# Patient Record
Sex: FEMALE | Race: BLACK OR AFRICAN AMERICAN | NOT HISPANIC OR LATINO | Employment: FULL TIME | URBAN - METROPOLITAN AREA
[De-identification: names, ages, dates, MRNs, and addresses within clinical notes are randomized per-mention and may not be internally consistent; named-entity substitution may affect disease eponyms.]

---

## 2022-06-06 PROBLEM — M51.369 BULGING OF LUMBAR INTERVERTEBRAL DISC: Status: ACTIVE | Noted: 2022-06-06

## 2022-09-02 PROBLEM — E66.812 CLASS 2 OBESITY DUE TO EXCESS CALORIES WITHOUT SERIOUS COMORBIDITY WITH BODY MASS INDEX (BMI) OF 37.0 TO 37.9 IN ADULT: Status: ACTIVE | Noted: 2022-09-02

## 2022-11-01 PROBLEM — G43.909 MIGRAINE: Chronic | Status: RESOLVED | Noted: 2020-02-06 | Resolved: 2022-11-01

## 2022-11-07 PROBLEM — R06.89 ACUTE RESPIRATORY INSUFFICIENCY: Status: RESOLVED | Noted: 2022-11-01 | Resolved: 2022-11-07

## 2023-04-12 PROBLEM — Z53.20 MAMMOGRAM DECLINED: Status: ACTIVE | Noted: 2023-04-12

## 2023-04-12 PROBLEM — U07.1 COVID-19: Status: RESOLVED | Noted: 2022-11-01 | Resolved: 2023-04-12

## 2023-04-12 PROBLEM — R73.01 IFG (IMPAIRED FASTING GLUCOSE): Status: ACTIVE | Noted: 2023-04-12

## 2023-05-09 ENCOUNTER — TELEPHONE (OUTPATIENT)
Dept: FAMILY MEDICINE CLINIC | Facility: CLINIC | Age: 53
End: 2023-05-09

## 2023-05-09 NOTE — TELEPHONE ENCOUNTER
Received form from [employer] [employer] will not complete without an appointment  LMOM to schedule an appointment

## 2023-05-10 ENCOUNTER — APPOINTMENT (OUTPATIENT)
Dept: LAB | Facility: CLINIC | Age: 53
End: 2023-05-10

## 2023-05-10 ENCOUNTER — OFFICE VISIT (OUTPATIENT)
Dept: FAMILY MEDICINE CLINIC | Facility: CLINIC | Age: 53
End: 2023-05-10

## 2023-05-10 VITALS
DIASTOLIC BLOOD PRESSURE: 90 MMHG | WEIGHT: 210.5 LBS | TEMPERATURE: 97.6 F | HEIGHT: 62 IN | SYSTOLIC BLOOD PRESSURE: 130 MMHG | BODY MASS INDEX: 38.74 KG/M2

## 2023-05-10 DIAGNOSIS — D50.8 IRON DEFICIENCY ANEMIA SECONDARY TO INADEQUATE DIETARY IRON INTAKE: ICD-10-CM

## 2023-05-10 DIAGNOSIS — R73.01 IFG (IMPAIRED FASTING GLUCOSE): ICD-10-CM

## 2023-05-10 DIAGNOSIS — E78.2 MIXED HYPERLIPIDEMIA: Chronic | ICD-10-CM

## 2023-05-10 DIAGNOSIS — M51.36 BULGING OF LUMBAR INTERVERTEBRAL DISC: Primary | ICD-10-CM

## 2023-05-10 DIAGNOSIS — Z12.31 ENCOUNTER FOR SCREENING MAMMOGRAM FOR MALIGNANT NEOPLASM OF BREAST: ICD-10-CM

## 2023-05-10 DIAGNOSIS — M43.06 LUMBAR SPONDYLOLYSIS: ICD-10-CM

## 2023-05-10 LAB
ALBUMIN SERPL BCP-MCNC: 3.6 G/DL (ref 3.5–5)
ALP SERPL-CCNC: 123 U/L (ref 46–116)
ALT SERPL W P-5'-P-CCNC: 25 U/L (ref 12–78)
ANION GAP SERPL CALCULATED.3IONS-SCNC: 1 MMOL/L (ref 4–13)
AST SERPL W P-5'-P-CCNC: 23 U/L (ref 5–45)
BASOPHILS # BLD AUTO: 0.08 THOUSANDS/ÂΜL (ref 0–0.1)
BASOPHILS NFR BLD AUTO: 1 % (ref 0–1)
BILIRUB SERPL-MCNC: 0.17 MG/DL (ref 0.2–1)
BILIRUB UR QL STRIP: NEGATIVE
BUN SERPL-MCNC: 13 MG/DL (ref 5–25)
CALCIUM SERPL-MCNC: 9.4 MG/DL (ref 8.3–10.1)
CHLORIDE SERPL-SCNC: 103 MMOL/L (ref 96–108)
CHOLEST SERPL-MCNC: 268 MG/DL
CLARITY UR: CLEAR
CO2 SERPL-SCNC: 31 MMOL/L (ref 21–32)
COLOR UR: NORMAL
CREAT SERPL-MCNC: 0.96 MG/DL (ref 0.6–1.3)
EOSINOPHIL # BLD AUTO: 0.26 THOUSAND/ÂΜL (ref 0–0.61)
EOSINOPHIL NFR BLD AUTO: 4 % (ref 0–6)
ERYTHROCYTE [DISTWIDTH] IN BLOOD BY AUTOMATED COUNT: 13.2 % (ref 11.6–15.1)
EST. AVERAGE GLUCOSE BLD GHB EST-MCNC: 128 MG/DL
FERRITIN SERPL-MCNC: 56 NG/ML (ref 8–388)
GFR SERPL CREATININE-BSD FRML MDRD: 67 ML/MIN/1.73SQ M
GLUCOSE P FAST SERPL-MCNC: 98 MG/DL (ref 65–99)
GLUCOSE UR STRIP-MCNC: NEGATIVE MG/DL
HBA1C MFR BLD: 6.1 %
HCT VFR BLD AUTO: 45.4 % (ref 34.8–46.1)
HDLC SERPL-MCNC: 47 MG/DL
HGB BLD-MCNC: 13.8 G/DL (ref 11.5–15.4)
HGB UR QL STRIP.AUTO: NEGATIVE
IMM GRANULOCYTES # BLD AUTO: 0.02 THOUSAND/UL (ref 0–0.2)
IMM GRANULOCYTES NFR BLD AUTO: 0 % (ref 0–2)
IRON SATN MFR SERPL: 19 % (ref 15–50)
IRON SERPL-MCNC: 68 UG/DL (ref 50–170)
KETONES UR STRIP-MCNC: NEGATIVE MG/DL
LDLC SERPL CALC-MCNC: 200 MG/DL (ref 0–100)
LEUKOCYTE ESTERASE UR QL STRIP: NEGATIVE
LYMPHOCYTES # BLD AUTO: 2.69 THOUSANDS/ÂΜL (ref 0.6–4.47)
LYMPHOCYTES NFR BLD AUTO: 41 % (ref 14–44)
MCH RBC QN AUTO: 27.2 PG (ref 26.8–34.3)
MCHC RBC AUTO-ENTMCNC: 30.4 G/DL (ref 31.4–37.4)
MCV RBC AUTO: 90 FL (ref 82–98)
MONOCYTES # BLD AUTO: 0.64 THOUSAND/ÂΜL (ref 0.17–1.22)
MONOCYTES NFR BLD AUTO: 10 % (ref 4–12)
NEUTROPHILS # BLD AUTO: 2.91 THOUSANDS/ÂΜL (ref 1.85–7.62)
NEUTS SEG NFR BLD AUTO: 44 % (ref 43–75)
NITRITE UR QL STRIP: NEGATIVE
NRBC BLD AUTO-RTO: 0 /100 WBCS
PH UR STRIP.AUTO: 6.5 [PH]
PLATELET # BLD AUTO: 313 THOUSANDS/UL (ref 149–390)
PMV BLD AUTO: 10.2 FL (ref 8.9–12.7)
POTASSIUM SERPL-SCNC: 3.4 MMOL/L (ref 3.5–5.3)
PROT SERPL-MCNC: 8.3 G/DL (ref 6.4–8.4)
PROT UR STRIP-MCNC: NEGATIVE MG/DL
RBC # BLD AUTO: 5.07 MILLION/UL (ref 3.81–5.12)
SODIUM SERPL-SCNC: 135 MMOL/L (ref 135–147)
SP GR UR STRIP.AUTO: 1.01 (ref 1–1.03)
TIBC SERPL-MCNC: 358 UG/DL (ref 250–450)
TRIGL SERPL-MCNC: 103 MG/DL
UROBILINOGEN UR STRIP-ACNC: <2 MG/DL
WBC # BLD AUTO: 6.6 THOUSAND/UL (ref 4.31–10.16)

## 2023-05-10 RX ORDER — CYCLOBENZAPRINE HCL 10 MG
10 TABLET ORAL 3 TIMES DAILY PRN
Qty: 30 TABLET | Refills: 2 | Status: SHIPPED | OUTPATIENT
Start: 2023-05-10

## 2023-05-10 NOTE — ASSESSMENT & PLAN NOTE
Patient was advised to continue Celebrex twice daily and Flexeril as needed for her pain  Patient reports that she will contact pain management to schedule an office visit in the near future

## 2023-05-10 NOTE — PROGRESS NOTES
Name: Natalie Nesbitt      : 1970      MRN: [id number]  Encounter Provider: ANDRZEJ Finley  Encounter Date: 5/10/2023   Encounter department: [facility] PRIMARY CARE    Assessment & Plan     1  Bulging of lumbar intervertebral disc  Assessment & Plan:  Patient was advised to continue Celebrex twice daily and Flexeril as needed for her pain  Patient reports that she will contact pain management to schedule an office visit in the near future  Orders:  -     cyclobenzaprine (FLEXERIL) 10 mg tablet; Take 1 tablet (10 mg total) by mouth 3 (three) times a day as needed for muscle spasms    2  Encounter for screening mammogram for malignant neoplasm of breast  -     Mammo screening bilateral w 3d & cad; Future; Expected date: 05/10/2023    3  Lumbar spondylolysis  Assessment & Plan:  Patient was advised to continue Celebrex twice daily and Flexeril as needed for her pain  Patient reports that she will contact pain management to schedule an office visit in the near future  Orders:  -     cyclobenzaprine (FLEXERIL) 10 mg tablet; Take 1 tablet (10 mg total) by mouth 3 (three) times a day as needed for muscle spasms    4  Mixed hyperlipidemia  Assessment & Plan:  Patient does have a lipid panel ordered to be completed prior to next office visit  Patient was advised to continue to limit fatty and fried foods in her diet  Subjective      Bulging of lumbar intervertebral disc/lumbar spondylosis: Patient does have a noted history of chronic lower back pain with recurrent sciatica symptoms  Patient did have an x-ray of her lumbar spine completed in  which showed lumbar spondylosis  She then had an MRI of her lumbar spine also completed in  which showed mild disc bulge with minimal narrowing L3-4 and L4-5, with mild-to-moderate spinal stenosis L5-S1    Patient was prescribed Celebrex 200 mg twice daily and Flexeril 10 mg 3 times daily as needed at her last office visit to help with her pain  Patient was also referred to pain management at that time but has not been scheduled to be seen by them yet  Patient reports that she was contacted by pain management to schedule appointment but she told them that she will call them back in the near future to be scheduled  Patient reports that the Celebrex and Flexeril have been improving her pain  She is still reporting recurrent lower back pain  Hyperlipidemia: Patient's most recent lipid panel did show elevated total cholesterol and LDL  Patient is currently managed on Lipitor 80 mg daily  Review of Systems   Constitutional: Negative for chills and fever  HENT: Negative for ear pain and sore throat  Eyes: Negative for pain and visual disturbance  Respiratory: Negative for cough, chest tightness, shortness of breath and wheezing  Cardiovascular: Negative for chest pain, palpitations and leg swelling  Gastrointestinal: Negative for abdominal pain, constipation, diarrhea, nausea and vomiting  Endocrine: Negative for cold intolerance and heat intolerance  Genitourinary: Negative for decreased urine volume, dysuria and hematuria  Musculoskeletal: Positive for back pain (lower-chronic )  Negative for arthralgias  Skin: Negative for color change and rash  Allergic/Immunologic: Negative for environmental allergies  Neurological: Negative for dizziness, seizures, syncope, weakness, light-headedness, numbness and headaches  Hematological: Negative for adenopathy  Psychiatric/Behavioral: Negative for confusion  The patient is not nervous/anxious  All other systems reviewed and are negative        Current Outpatient Medications on File Prior to Visit   Medication Sig   • acetaminophen (TYLENOL) 500 mg tablet Take 2 tablets (1,000 mg total) by mouth every 6 (six) hours as needed for moderate pain   • aspirin 81 mg chewable tablet Chew 1 tablet (81 mg total) daily (Patient taking differently: Chew 81 mg as needed)   • "atorvastatin (LIPITOR) 80 mg tablet Take 1 tablet (80 mg total) by mouth daily   • butalbital-acetaminophen-caffeine (FIORICET,ESGIC) -40 mg per tablet TAKE 1 TABLET BY MOUTH EVERY 4 HOURS AS NEEDED FOR MIGRAINE  NO MORE THAN 3 DOSES EVERY WEEK   • celecoxib (CeleBREX) 200 mg capsule Take 1 capsule (200 mg total) by mouth 2 (two) times a day   • cholecalciferol (VITAMIN D3) 1,000 units tablet Take 1,000 Units by mouth daily   • Diclofenac Sodium (VOLTAREN) 1 % Apply 2 g topically 4 (four) times a day   • liraglutide (SAXENDA) injection Inject 0 1 mL (0 6 mg total) under the skin daily   • meclizine (ANTIVERT) 12 5 MG tablet 1 tab TID prn vertigo  • midodrine (PROAMATINE) 5 mg tablet TAKE 1 TABLET BY MOUTH AS NEEDED FOR VERTIGO   • nitroglycerin (NITROSTAT) 0 4 mg SL tablet DISSOLVE 1 TABLET UNDER THE TONGUE EVERY 5 MINUTES AS NEEDED FOR CHEST PAIN   • omeprazole (PriLOSEC) 40 MG capsule TAKE 1 CAPSULE(40 MG) BY MOUTH EVERY MORNING   • [DISCONTINUED] cyclobenzaprine (FLEXERIL) 10 mg tablet Take 1 tablet (10 mg total) by mouth 3 (three) times a day as needed for muscle spasms       Objective     /90 (BP Location: Right arm, Patient Position: Sitting, Cuff Size: Large)   Temp 97 6 °F (36 4 °C) (Temporal)   Ht 5' 2\" (1 575 m)   Wt 95 5 kg (210 lb 8 oz)   LMP 01/03/2022 (Approximate)   BMI 38 50 kg/m²     Physical Exam  Vitals and nursing note reviewed  Constitutional:       General: She is not in acute distress  Appearance: Normal appearance  She is not ill-appearing  HENT:      Head: Normocephalic  Eyes:      Conjunctiva/sclera: Conjunctivae normal    Cardiovascular:      Rate and Rhythm: Normal rate and regular rhythm  Pulses: Normal pulses  Carotid pulses are 2+ on the right side and 2+ on the left side  Radial pulses are 2+ on the right side and 2+ on the left side  Posterior tibial pulses are 2+ on the right side and 2+ on the left side        Heart sounds: " Normal heart sounds  No murmur heard  Pulmonary:      Effort: Pulmonary effort is normal  No respiratory distress  Breath sounds: Normal breath sounds  No decreased breath sounds, wheezing, rhonchi or rales  Abdominal:      General: Abdomen is flat  Bowel sounds are normal  There is no distension  Palpations: Abdomen is soft  Tenderness: There is no abdominal tenderness  There is no guarding  Musculoskeletal:      Cervical back: Normal range of motion  Lumbar back: Spasms, tenderness and bony tenderness present  No swelling or edema  Decreased range of motion  Right lower leg: No edema  Left lower leg: No edema  Skin:     General: Skin is warm and dry  Capillary Refill: Capillary refill takes less than 2 seconds  Neurological:      General: No focal deficit present  Mental Status: She is alert and oriented to person, place, and time  Psychiatric:         Mood and Affect: Mood normal          Behavior: Behavior normal          Thought Content:  Thought content normal          Judgment: Judgment normal        ANDRZEJ Foster

## 2023-05-11 PROBLEM — R73.03 PREDIABETES: Status: ACTIVE | Noted: 2023-04-12

## 2023-05-22 ENCOUNTER — TELEPHONE (OUTPATIENT)
Dept: FAMILY MEDICINE CLINIC | Facility: CLINIC | Age: 53
End: 2023-05-22

## 2023-05-22 NOTE — TELEPHONE ENCOUNTER
Received a voicemail:    Yes, good morning  My name is Rachelle Troy  I was trying to find out if I could drop off my FMLA papers to [employer]  If someone could give me a call back at [phone]  Thank you     _____________________________________________    I called patient and left a voicemail that she can drop off the forms and we can give them to [address] [place] [address], and if an office visit is needed we would let her know  I also said that we could just schedule an appointment to have the forms done then  Asked patient to call back

## 2023-05-24 ENCOUNTER — TELEPHONE (OUTPATIENT)
Dept: FAMILY MEDICINE CLINIC | Facility: CLINIC | Age: 53
End: 2023-05-24

## 2023-05-24 NOTE — TELEPHONE ENCOUNTER
Please ask patient if she has missed work for a continuous period of time due to her lower back pain  Also, does she think that she will need to miss work on an intermittent basis due to this? If she does feel that she needs to miss work how often does she feel this will be necessary? For example, how many times per month will she need to miss? Also, please advise patient to schedule an office visit for follow-up with pain management

## 2023-06-01 NOTE — TELEPHONE ENCOUNTER
Patient is calling back:    1  Patient hasn't missed work for a continuous amount of time because she did not have the FMLA, so she went to work with the pain  If anything, she was out for 2 days maximum with the pain  2  Patient states if she missed it would be between 3-6 days a month  3  I did let her know to follow up with pain management  Per pt, they didn't have appointments until late July into August  She is going to call back & see if they have anything sooner   Advised patient to take the July appointment & to follow up with them to see if anything opened sooner, or if there is a cancellation list

## 2023-06-02 NOTE — TELEPHONE ENCOUNTER
LMOM to let patient know that form is completed and that there is a charge of 25 00 for form before it can be faxed or picked up  Form is in [employer]  Once payment is received it needs to be faxed to [phone] and then scan form to chart

## 2023-06-09 ENCOUNTER — CONSULT (OUTPATIENT)
Dept: PAIN MEDICINE | Facility: CLINIC | Age: 53
End: 2023-06-09
Payer: COMMERCIAL

## 2023-06-09 VITALS
BODY MASS INDEX: 38.64 KG/M2 | HEIGHT: 62 IN | DIASTOLIC BLOOD PRESSURE: 68 MMHG | SYSTOLIC BLOOD PRESSURE: 114 MMHG | WEIGHT: 210 LBS

## 2023-06-09 DIAGNOSIS — M54.16 LUMBAR RADICULOPATHY: Primary | ICD-10-CM

## 2023-06-09 DIAGNOSIS — M51.26 LUMBAR DISC DISPLACEMENT WITHOUT MYELOPATHY: ICD-10-CM

## 2023-06-09 PROCEDURE — 99204 OFFICE O/P NEW MOD 45 MIN: CPT | Performed by: ANESTHESIOLOGY

## 2023-06-09 NOTE — PROGRESS NOTES
Assessment  1  Lumbar radiculopathy    2  Lumbar disc displacement without myelopathy        Plan  Patient presenting with chronic back pain for 4+ years, worsening over the past several months  During today's evaluation patient is demonstrating pain that is multifactorial in nature, with the main pain generator likely stemming from lumbar radiculitis  Pain consistent with lumbar radicular pain accompanied by pain >7/10 on the pain scale with inability to participate in IADLs for >6 weeks  Patient states that physical therapy was not helping so she discontinued due to financial and time constraints  Has been taking ibuprofen, magnesium, cyclobenzaprine with modest benefit  Denies any gait instability, saddle anesthesia  In regards to the patient's lumbar pathology, we discussed the various treatment options including physical therapy, chiropractic treatment, medication management, activity modifications, interventional spine procedures  Given that patient has not had any benefit with conservative treatments, I think patient would benefit from targeted interventional treatment modalities  I personally reviewed and interpreted pertinent imaging studies - specifically lumbar MRI and discussed the results and clinical significance with the patient  Patient's MRI shows diffuse disc bulging at L3-4, L4-5, L5-S1 with mild bilateral facet arthropathy  Mild to moderate bilateral foraminal narrowing at L5-S1 with moderate disc degeneration at L5-S1     -At this time we will offer the patient a L5-S1 lumbar epidural steroid injection for lumbar radiculitis refractory to ongoing conservative treatments including physical therapy and medication management  Risks, benefits, and alternatives to epidural steroid injections thoroughly discussed with patient  Complete risks and benefits including bleeding, infection, tissue reaction, nerve injury and allergic reaction were discussed   The approach was demonstrated using models and literature was provided      -Patient was recommended to continue with her medication regimen for a multimodal approach including NSAIDs, magnesium and cyclobenzaprine  I reviewed external notes from the relevant aspects of the patient's medical record, specifically primary care physician, physical therapy notes in regards to current and prior treatments tried (as mentioned in history of present illness)  I reviewed pertinent laboratory studies, specifically renal function, hemoglobin A1c, CBC, coagulation studies, prior to initiating the recommended medication therapies/interventional treatment options  Handouts provided questions answered to patient's satisfaction  Lifestyle modifications extensively discussed including diet, exercise and weight loss in addition to core strengthening  [place] Prescription Drug Monitoring Program report was reviewed and was appropriate     My impressions and treatment recommendations were discussed in detail with the patient who verbalized understanding and had no further questions  Discharge instructions were provided  I personally saw and examined the patient and I agree with the above discussed plan of care  Orders Placed This Encounter   Procedures   • FL spine and pain procedure     Standing Status:   Future     Standing Expiration Date:   6/9/2027     Order Specific Question:   Reason for Exam:     Answer:   left L5-S1 LESI     Order Specific Question:   Is the patient pregnant? Answer:   No     Order Specific Question:   Anticoagulant hold needed? Answer:   no     No orders of the defined types were placed in this encounter  History of Present Illness    Lesia Sanchez is a 45 y o  female presenting for consultation at [facility] for exam and evaluation of chronic low back and radicular leg pain for greater than 4+ years, worsening over the past several months   Pain started without any precipitating injury or trauma  Over the past month, the intensity of pain has been Severe  Pain is currently 10/10  Pain does interfere with age appropriate activities of daily living  Pain is nearly constant, with no typical pattern throughout the day  Pain is described as sharp, pressure-like  Patient endorses weakness in the lower extremities  Assistance device used: None  Pain is increased with bending, sitting  Pain is decreased with lying down, walking  Patient's past medical tree is significant for history of CVA, migraine headaches, hyperlipidemia, GERD/reflux  Patient denies any significant social history  Prior pertinent treatments tried: Physical therapy  Pertinent medications tried/currently taking: NSAIDs, lidocaine patch, Tylenol, cyclobenzaprine, magnesium  I have personally reviewed and/or updated the patient's past medical history, past surgical history, family history, social history, current medications, allergies, and vital signs today  Review of Systems   Constitutional: Negative for chills and fever  HENT: Negative for ear pain and sore throat  Eyes: Negative for pain and visual disturbance  Respiratory: Negative for cough and shortness of breath  Cardiovascular: Negative for chest pain and palpitations  Gastrointestinal: Negative for abdominal pain and vomiting  Genitourinary: Negative for dysuria and hematuria  Musculoskeletal: Positive for back pain, gait problem and myalgias  Negative for arthralgias  Skin: Negative for color change and rash  Neurological: Positive for weakness (legs)  Negative for seizures and syncope  Psychiatric/Behavioral: Positive for sleep disturbance  All other systems reviewed and are negative        Patient Active Problem List   Diagnosis   • Hyperlipidemia   • Vertigo   • GERD (gastroesophageal reflux disease)   • Osteoarthritis   • Hx of transient ischemic attack (TIA)   • Myofascial pain   • B12 deficiency   • Cervicalgia   • Snoring   • Excessive daytime sleepiness   • Major depressive disorder, recurrent severe without psychotic features ([facility] )   • Iron deficiency anemia secondary to inadequate dietary iron intake   • Vitamin D deficiency   • Hot flashes due to menopause   • De Quervain's disease (radial styloid tenosynovitis)   • Lumbar spondylolysis   • Bulging of lumbar intervertebral disc   • Class 2 obesity due to excess calories without serious comorbidity with body mass index (BMI) of 37 0 to 37 9 in adult   • Hypokalemia   • Prediabetes       Past Medical History:   Diagnosis Date   • Acute CVA (cerebrovascular accident) ([facility] ) 2/5/2020   • Acute respiratory insufficiency 11/1/2022   • Anemia     Hx Iron Infusions last one 6-2021   • Angina pectoris ([place] [id number] )    • COVID-19 11/1/2022   • GERD (gastroesophageal reflux disease)    • Heart murmur    • Hyperlipidemia    • Migraine    • TIA (transient ischemic attack) 2010    no residual   • Tobacco use 5/5/2020   • Vertigo        Past Surgical History:   Procedure Laterality Date   • FOOT SURGERY Right     ankle   • FOREARM SURGERY Left    • [place] INCISION EXTENSOR TENDON SHEATH WRIST Right 2/1/2022    Procedure: RELEASE DEQUERVAINS, TENOSYNOVECTOMY OF THE APL AND EPB TENDONS;  Surgeon: Daniela Koch MD;  Location: [facility] MAIN OR;  Service: Orthopedics   • SUPERFICIAL LYMPH NODE BIOPSY / EXCISION Left    • TUBAL LIGATION  1993       Family History   Problem Relation Age of Onset   • Hypertension Mother        Social History     Occupational History   • Not on file   Tobacco Use   • Smoking status: Every Day     Packs/day: 0 25     Years: 35 00     Total pack years: 8 75     Types: Cigarettes   • Smokeless tobacco: Never   • Tobacco comments:     2-3 per day   Vaping Use   • Vaping Use: Never used   Substance and Sexual Activity   • Alcohol use: Not Currently   • Drug use: Never   • Sexual activity: Not on file       Current Outpatient Medications on File Prior to Visit   Medication Sig "  • acetaminophen (TYLENOL) 500 mg tablet Take 2 tablets (1,000 mg total) by mouth every 6 (six) hours as needed for moderate pain   • aspirin 81 mg chewable tablet Chew 1 tablet (81 mg total) daily (Patient taking differently: Chew 81 mg as needed)   • atorvastatin (LIPITOR) 80 mg tablet Take 1 tablet (80 mg total) by mouth daily   • butalbital-acetaminophen-caffeine (FIORICET,ESGIC) -40 mg per tablet TAKE 1 TABLET BY MOUTH EVERY 4 HOURS AS NEEDED FOR MIGRAINE  NO MORE THAN 3 DOSES EVERY WEEK   • celecoxib (CeleBREX) 200 mg capsule Take 1 capsule (200 mg total) by mouth 2 (two) times a day   • cholecalciferol (VITAMIN D3) 1,000 units tablet Take 1,000 Units by mouth daily   • cyclobenzaprine (FLEXERIL) 10 mg tablet Take 1 tablet (10 mg total) by mouth 3 (three) times a day as needed for muscle spasms   • Diclofenac Sodium (VOLTAREN) 1 % Apply 2 g topically 4 (four) times a day   • liraglutide (SAXENDA) injection Inject 0 1 mL (0 6 mg total) under the skin daily   • meclizine (ANTIVERT) 12 5 MG tablet 1 tab TID prn vertigo  • midodrine (PROAMATINE) 5 mg tablet TAKE 1 TABLET BY MOUTH AS NEEDED FOR VERTIGO   • nitroglycerin (NITROSTAT) 0 4 mg SL tablet DISSOLVE 1 TABLET UNDER THE TONGUE EVERY 5 MINUTES AS NEEDED FOR CHEST PAIN   • omeprazole (PriLOSEC) 40 MG capsule TAKE 1 CAPSULE(40 MG) BY MOUTH EVERY MORNING     No current facility-administered medications on file prior to visit  Allergies   Allergen Reactions   • Paxil [Paroxetine] Tongue Swelling     Pt called pcp to inform on 4/21/22 happened twice rcruz   • Penicillins Hives       Physical Exam    /68   Ht 5' 2\" (1 575 m)   Wt 95 3 kg (210 lb)   LMP 01/03/2022 (Approximate)   BMI 38 41 kg/m²     Constitutional: normal, well developed, well nourished, alert, in no distress and non-toxic and no overt pain behavior    Eyes: anicteric  HEENT: grossly intact  Neck: supple, symmetric, trachea midline and no masses   Pulmonary:even and " unlabored  Cardiovascular:No edema or pitting edema present  Skin:Normal without rashes or lesions and well hydrated  Psychiatric:Mood and affect appropriate  Neurologic: Gait is slow, antalgic  No focal neurologic deficit  Musculoskeletal: Pain with lumbar extension, lateral flexion, forward flexion  Imaging  MRI lumbar spine 5/23/2022:  FINDINGS:     VERTEBRAL BODIES:  There are 5 lumbar type vertebral bodies  Normal alignment of the lumbar spine  No spondylolysis or spondylolisthesis  No scoliosis  No compression fracture         Type II Modic endplate change at F0-D2  Otherwise, normal bone marrow signal      SACRUM:  Normal signal within the sacrum  No evidence of insufficiency or stress fracture      DISTAL CORD AND CONUS:  Normal size and signal within the distal cord and conus      PARASPINAL SOFT TISSUES:  Paraspinal soft tissues are unremarkable      LOWER THORACIC DISC SPACES:  Mild noncompressive lower thoracic degenerative change      LUMBAR DISC SPACES:  Moderate disc height loss at L5-S1      L1-L2:  Normal      L2-L3:  Normal      L3-L4:  Diffuse disc bulge  Mild facet arthropathy  No significant canal stenosis  Mild right foraminal narrowing      L4-L5:  Diffuse disc bulge  Mild facet arthropathy  No significant canal stenosis  Mild bilateral foraminal narrowing      L5-S1:  Diffuse disc bulge  Mild facet arthropathy  No significant canal stenosis  Mild-to-moderate bilateral foraminal narrowing      OTHER: Partially imaged colonic diverticulosis      IMPRESSION:     Multilevel degenerative changes of lumbar spine with varying degrees of foraminal narrowing (mild-to-moderate bilateral L5-S1), as detailed above  No significant canal stenosis

## 2023-06-29 ENCOUNTER — HOSPITAL ENCOUNTER (OUTPATIENT)
Dept: RADIOLOGY | Facility: MEDICAL CENTER | Age: 53
Discharge: HOME | End: 2023-06-29
Payer: COMMERCIAL

## 2023-06-29 VITALS
SYSTOLIC BLOOD PRESSURE: 120 MMHG | HEART RATE: 54 BPM | RESPIRATION RATE: 20 BRPM | TEMPERATURE: 97.8 F | OXYGEN SATURATION: 100 % | DIASTOLIC BLOOD PRESSURE: 80 MMHG

## 2023-06-29 DIAGNOSIS — M54.16 LUMBAR RADICULOPATHY: ICD-10-CM

## 2023-06-29 PROCEDURE — 62323 NJX INTERLAMINAR LMBR/SAC: CPT | Performed by: ANESTHESIOLOGY

## 2023-06-29 RX ORDER — METHYLPREDNISOLONE ACETATE 80 MG/ML
80 INJECTION, SUSPENSION INTRA-ARTICULAR; INTRALESIONAL; INTRAMUSCULAR; PARENTERAL; SOFT TISSUE ONCE
Status: COMPLETED | OUTPATIENT
Start: 2023-06-29 | End: 2023-06-29

## 2023-06-29 RX ORDER — BUPIVACAINE HCL/PF 2.5 MG/ML
1 VIAL (ML) INJECTION ONCE
Status: COMPLETED | OUTPATIENT
Start: 2023-06-29 | End: 2023-06-29

## 2023-06-29 RX ADMIN — METHYLPREDNISOLONE ACETATE 80 MG: 80 INJECTION, SUSPENSION INTRA-ARTICULAR; INTRALESIONAL; INTRAMUSCULAR; PARENTERAL; SOFT TISSUE at 09:26

## 2023-06-29 RX ADMIN — IOHEXOL 1 ML: 300 INJECTION, SOLUTION INTRAVENOUS at 09:20

## 2023-06-29 RX ADMIN — Medication 1 ML: at 09:25

## 2023-06-29 NOTE — H&P
History of Present Illness: The patient is a 45 y o  female who presents with complaints of back and leg pain    Past Medical History:   Diagnosis Date   • Acute CVA (cerebrovascular accident) ([facility] [address] ) 2/5/2020   • Acute respiratory insufficiency 11/1/2022   • Anemia     Hx Iron Infusions last one 6-2021   • Angina pectoris ([facility] [address] )    • COVID-19 11/1/2022   • GERD (gastroesophageal reflux disease)    • Heart murmur    • Hyperlipidemia    • Migraine    • TIA (transient ischemic attack) 2010    no residual   • Tobacco use 5/5/2020   • Vertigo        Past Surgical History:   Procedure Laterality Date   • FOOT SURGERY Right     ankle   • FOREARM SURGERY Left    • MI INCISION EXTENSOR TENDON SHEATH WRIST Right 2/1/2022    Procedure: RELEASE DEQUERVAINS, TENOSYNOVECTOMY OF THE APL AND EPB TENDONS;  Surgeon: Delton Dance, MD;  Location: [facility] MAIN OR;  Service: Orthopedics   • SUPERFICIAL LYMPH NODE BIOPSY / EXCISION Left    • TUBAL LIGATION  1993         Current Outpatient Medications:   •  acetaminophen (TYLENOL) 500 mg tablet, Take 2 tablets (1,000 mg total) by mouth every 6 (six) hours as needed for moderate pain, Disp: 30 tablet, Rfl: 0  •  aspirin 81 mg chewable tablet, Chew 1 tablet (81 mg total) daily (Patient taking differently: Chew 81 mg as needed), Disp: 90 tablet, Rfl: 3  •  atorvastatin (LIPITOR) 80 mg tablet, Take 1 tablet (80 mg total) by mouth daily, Disp: 90 tablet, Rfl: 0  •  butalbital-acetaminophen-caffeine (FIORICET,ESGIC) -40 mg per tablet, TAKE 1 TABLET BY MOUTH EVERY 4 HOURS AS NEEDED FOR MIGRAINE   NO MORE THAN 3 DOSES EVERY WEEK, Disp: 10 tablet, Rfl: 0  •  celecoxib (CeleBREX) 200 mg capsule, Take 1 capsule (200 mg total) by mouth 2 (two) times a day, Disp: 60 capsule, Rfl: 1  •  cholecalciferol (VITAMIN D3) 1,000 units tablet, Take 1,000 Units by mouth daily, Disp: , Rfl:   •  cyclobenzaprine (FLEXERIL) 10 mg tablet, Take 1 tablet (10 mg total) by mouth 3 (three) times a day as needed for muscle spasms, Disp: 30 tablet, Rfl: 2  •  Diclofenac Sodium (VOLTAREN) 1 %, Apply 2 g topically 4 (four) times a day, Disp: 100 g, Rfl: 3  •  liraglutide (SAXENDA) injection, Inject 0 1 mL (0 6 mg total) under the skin daily, Disp: 3 mL, Rfl: 2  •  meclizine (ANTIVERT) 12 5 MG tablet, 1 tab TID prn vertigo  , Disp: 30 tablet, Rfl: 0  •  midodrine (PROAMATINE) 5 mg tablet, TAKE 1 TABLET BY MOUTH AS NEEDED FOR VERTIGO, Disp: 90 tablet, Rfl: 1  •  nitroglycerin (NITROSTAT) 0 4 mg SL tablet, DISSOLVE 1 TABLET UNDER THE TONGUE EVERY 5 MINUTES AS NEEDED FOR CHEST PAIN, Disp: 25 tablet, Rfl: 0  •  omeprazole (PriLOSEC) 40 MG capsule, TAKE 1 CAPSULE(40 MG) BY MOUTH EVERY MORNING, Disp: 90 capsule, Rfl: 0    Current Facility-Administered Medications:   •  bupivacaine (PF) (MARCAINE) 0 25 % injection 1 mL, 1 mL, Epidural, Once, Will Devin Gannon MD  •  iohexol (OMNIPAQUE) 300 mg/mL injection 1 mL, 1 mL, Epidural, Once, Will Devin Gannon MD  •  methylPREDNISolone acetate (DEPO-MEDROL) injection 80 mg, 80 mg, Epidural, Once, Will Devin Gannon MD    Allergies   Allergen Reactions   • Paxil [Paroxetine] Tongue Swelling     Pt called pcp to inform on 4/21/22 happened twice rcruz   • Penicillins Hives       Physical Exam:   Vitals:    06/29/23 0908   BP: 114/77   Pulse: 55   Resp: 18   Temp: 97 8 °F (36 6 °C)   SpO2: 100%     General: Awake, Alert, Oriented x 3, Mood and affect appropriate  Respiratory: Respirations even and unlabored  Cardiovascular: Peripheral pulses intact; no edema  Musculoskeletal Exam: pain with lumbar spine ROM    ASA Score: 3    Patient/Chart Verification  Patient ID Verified: Verbal  ID Band Applied: No  Consents Confirmed: Procedural, To be obtained in the Pre-Procedure area  H&P( within 30 days) Verified: To be obtained in the Pre-Procedure area  Interval H&P(within 24 hr) Complete (required for Outpatients and Surgery Admit only):  To be obtained in the Pre-Procedure area  Allergies Reviewed: Yes  Anticoag/NSAID held?: NA (Pt takes 81 mg aspirin  Hold not required for this procedure )  Currently on antibiotics?: No  Pregnancy denied?: NA    Assessment:   1   Lumbar radiculopathy        Plan: left L5-S1 LESI

## 2023-06-29 NOTE — DISCHARGE INSTRUCTIONS
Epidural Steroid Injection   WHAT YOU NEED TO KNOW:   An epidural steroid injection (JENNIFER) is a procedure to inject steroid medicine into the epidural space  The epidural space is between your spinal cord and vertebrae  Steroids reduce inflammation and fluid buildup in your spine that may be causing pain  You may be given pain medicine along with the steroids  ACTIVITY  Do not drive or operate machinery today  No strenuous activity today - bending, lifting, etc   You may resume normal activites starting tomorrow - start slowly and as tolerated  You may shower today, but no tub baths or hot tubs  You may have numbness for several hours from the local anesthetic  Please use caution and common sense, especially with weight-bearing activities  CARE OF THE INJECTION SITE  If you have soreness or pain, apply ice to the area today (20 minutes on/20 minutes off)  Starting tomorrow, you may use warm, moist heat or ice if needed  You may have an increase or change in your discomfort for 36-48 hours after your treatment  Apply ice and continue with any pain medication you have been prescribed  Notify the [facility] if you have any of the following: redness, drainage, swelling, headache, stiff neck or fever above 100°F     SPECIAL INSTRUCTIONS  Our office will contact you in approximately 7 days for a progress report  MEDICATIONS  Continue to take all routine medications  Our office may have instructed you to hold some medications  As no general anesthesia was used in today's procedure, you should not experience any side effects related to anesthesia  If you are diabetic, the steroids used in today's injection may temporarily increase your blood sugar levels after the first few days after your injection  Please keep a close eye on your sugars and alert the doctor who manages your diabetes if your sugars are significantly high from your baseline or you are symptomatic       If you have a problem specifically related to your procedure, please call our office at [phone]  Problems not related to your procedure should be directed to your primary care physician

## 2023-07-06 ENCOUNTER — TELEPHONE (OUTPATIENT)
Dept: PAIN MEDICINE | Facility: CLINIC | Age: 53
End: 2023-07-06

## 2023-07-06 NOTE — TELEPHONE ENCOUNTER
Caller: Cristiana PT    Doctor: Dr Virk     Reason for call: Pt returned call with 80% improvement and pain level 3 /10.  There is some pressure what can she use     Call back#: [phone]

## 2023-07-24 ENCOUNTER — OFFICE VISIT (OUTPATIENT)
Dept: PAIN MEDICINE | Facility: CLINIC | Age: 53
End: 2023-07-24
Payer: COMMERCIAL

## 2023-07-24 VITALS
BODY MASS INDEX: 38.64 KG/M2 | SYSTOLIC BLOOD PRESSURE: 110 MMHG | DIASTOLIC BLOOD PRESSURE: 70 MMHG | HEIGHT: 62 IN | WEIGHT: 210 LBS

## 2023-07-24 DIAGNOSIS — M47.816 LUMBAR SPONDYLOSIS: ICD-10-CM

## 2023-07-24 DIAGNOSIS — M54.16 LUMBAR RADICULITIS: Primary | ICD-10-CM

## 2023-07-24 PROCEDURE — 99214 OFFICE O/P EST MOD 30 MIN: CPT | Performed by: ANESTHESIOLOGY

## 2023-07-24 NOTE — PROGRESS NOTES
Assessment:  1. Lumbar radiculitis    2. Lumbar spondylosis        Plan:    Patient presenting with chronic back pain for 4+ years. Patient's history, exam and imaging findings are consistent with lumbar spondylosis and lumbar radiculitis. Symptoms are accompanied accompanied by episodic pain episodes with >7/10 severity on the pain scale with inability to participate in IADLs. Patient states that most recent course of physical therapy was not helping so she discontinued due to financial and time constraints. Has been taking ibuprofen, magnesium, cyclobenzaprine with modest benefit. Denies any gait instability, saddle anesthesia.     In regards to the patient's lumbar pathology, we discussed the various treatment options including physical therapy, chiropractic treatment, medication management, activity modifications, interventional spine procedures.  Given that patient has not had any benefit with conservative treatments, I think patient would benefit from targeted interventional treatment modalities.     Reviewed and interpreted pertinent imaging studies - specifically lumbar MRI and discussed the results and clinical significance with the patient. Patient's MRI shows diffuse disc bulging at L3-4, L4-5, L5-S1 with mild bilateral facet arthropathy. Mild to moderate bilateral foraminal narrowing at L5-S1 with moderate disc degeneration at L5-S1.     -At this time patient does note 50% ongoing improvement in regards to her lumbar radicular symptoms  following L5-S1 epidural steroid injection. She still has a significant degree of axial lower back pain which is consistent with lumbar spondylosis. I offer the patient to schedule bilateral L3-5 medial branch nerve blocks with intention of moving forward towards radiofrequency ablation if there is an appropriate diagnostic response.  The initial blocks will be performed with 2% lidocaine and if an appropriate response is obtained upon review of the patient's pain diary, a confirmatory block will be scheduled with 0.5% bupivacaine. In the office today, we reviewed the nature of facet joint pathology in depth using a spine model. We discussed the approach we would use for the injections and provided literature for home review. The patient understands the risks associated with the procedure including bleeding, infection, tissue injury, and allergic reaction and provided verbal informed consent in the office today.    -Patient would like to review the medial branch block and radiofrequency literature and will contact our office if she wants to proceed with scheduling this.      -Patient was recommended to continue with her medication regimen for a multimodal approach including NSAIDs, magnesium and cyclobenzaprine.     Reviewed external notes from the relevant aspects of the patient's medical record, specifically primary care physician, physical therapy notes in regards to current and prior treatments tried (as mentioned in history of present illness).     Reviewed pertinent laboratory studies, specifically renal function, hemoglobin A1c, CBC, coagulation studies, prior to initiating the recommended medication therapies/interventional treatment options.     [place] Prescription Drug Monitoring Program report was reviewed and was appropriate      My impressions and treatment recommendations were discussed in detail with the patient who verbalized understanding and had no further questions. Discharge instructions were provided. I personally saw and examined the patient and I agree with the above discussed plan of care. No orders of the defined types were placed in this encounter. No orders of the defined types were placed in this encounter. History of Present Illness:  Saran Guerrero is a 52 y.o. female who presents for a follow up office visit in regards to Back Pain (Follow up lesi).    The patient’s current symptoms include improved lower back pain occasionally rating to the legs. Pain is currently rated an 8 out of 10 on the pain scale. Patient describes the pain as an intermittent throbbing, shooting pain worse in the evening. Patient states that after her L5-S1 epidural steroid injection on 6/21/2023, she had 2 weeks of greater than 75% pain relief. She did return to her usual work activities which then worsened her pain. Overall she is 50% improved compared to her baseline pain levels and symptoms. I have personally reviewed and/or updated the patient's past medical history, past surgical history, family history, social history, current medications, allergies, and vital signs today. Review of Systems   Constitutional: Negative for chills and fever. HENT: Negative for ear pain and sore throat. Eyes: Negative for pain and visual disturbance. Respiratory: Negative for cough and shortness of breath. Cardiovascular: Negative for chest pain and palpitations. Gastrointestinal: Negative for abdominal pain and vomiting. Genitourinary: Negative for dysuria and hematuria. Musculoskeletal: Positive for back pain and gait problem. Negative for arthralgias. Skin: Negative for color change and rash. Neurological: Negative for seizures and syncope. All other systems reviewed and are negative.       Patient Active Problem List   Diagnosis   • Hyperlipidemia   • Vertigo   • GERD (gastroesophageal reflux disease)   • Osteoarthritis   • Hx of transient ischemic attack (TIA)   • Myofascial pain   • B12 deficiency   • Cervicalgia   • Snoring   • Excessive daytime sleepiness   • Major depressive disorder, recurrent severe without psychotic features (HCC)   • Iron deficiency anemia secondary to inadequate dietary iron intake   • Vitamin D deficiency   • Hot flashes due to menopause   • De Quervain's disease (radial styloid tenosynovitis)   • Lumbar spondylolysis   • Bulging of lumbar intervertebral disc   • Class 2 obesity due to excess calories without serious comorbidity with body mass index (BMI) of 37.0 to 37.9 in adult   • Hypokalemia   • Prediabetes   • Lumbar radiculopathy       Past Medical History:   Diagnosis Date   • Acute CVA (cerebrovascular accident) ([address]) 2/5/2020   • Acute respiratory insufficiency 11/1/2022   • Anemia     Hx Iron Infusions last one 6-2021   • Angina pectoris ([address])    • COVID-19 11/1/2022   • GERD (gastroesophageal reflux disease)    • Heart murmur    • Hyperlipidemia    • Migraine    • TIA (transient ischemic attack) 2010    no residual   • Tobacco use 5/5/2020   • Vertigo        Past Surgical History:   Procedure Laterality Date   • FOOT SURGERY Right     ankle   • FOREARM SURGERY Left    • [place] INCISION EXTENSOR TENDON SHEATH WRIST Right 2/1/2022    Procedure: RELEASE DEQUERVAINS, TENOSYNOVECTOMY OF THE APL AND EPB TENDONS;  Surgeon: Alethea Randolph MD;  Location: [facility] MAIN OR;  Service: Orthopedics   • SUPERFICIAL LYMPH NODE BIOPSY / EXCISION Left    • TUBAL LIGATION  1993       Family History   Problem Relation Age of Onset   • Hypertension Mother        Social History     Occupational History   • Not on file   Tobacco Use   • Smoking status: Every Day     Packs/day: 0.25     Years: 35.00     Total pack years: 8.75     Types: Cigarettes   • Smokeless tobacco: Never   • Tobacco comments:     2-3 per day   Vaping Use   • Vaping Use: Never used   Substance and Sexual Activity   • Alcohol use: Not Currently   • Drug use: Never   • Sexual activity: Not on file       Current Outpatient Medications on File Prior to Visit   Medication Sig   • acetaminophen (TYLENOL) 500 mg tablet Take 2 tablets (1,000 mg total) by mouth every 6 (six) hours as needed for moderate pain   • aspirin 81 mg chewable tablet Chew 1 tablet (81 mg total) daily (Patient taking differently: Chew 81 mg as needed)   • atorvastatin (LIPITOR) 80 mg tablet Take 1 tablet (80 mg total) by mouth daily   • butalbital-acetaminophen-caffeine (FIORICET,ESGIC) -40 mg per tablet TAKE 1 TABLET BY MOUTH EVERY 4 HOURS AS NEEDED FOR MIGRAINE. NO MORE THAN 3 DOSES EVERY WEEK   • celecoxib (CeleBREX) 200 mg capsule Take 1 capsule (200 mg total) by mouth 2 (two) times a day   • cholecalciferol (VITAMIN D3) 1,000 units tablet Take 1,000 Units by mouth daily   • cyclobenzaprine (FLEXERIL) 10 mg tablet Take 1 tablet (10 mg total) by mouth 3 (three) times a day as needed for muscle spasms   • Diclofenac Sodium (VOLTAREN) 1 % Apply 2 g topically 4 (four) times a day   • liraglutide (SAXENDA) injection Inject 0.1 mL (0.6 mg total) under the skin daily   • meclizine (ANTIVERT) 12.5 MG tablet 1 tab TID prn vertigo. • midodrine (PROAMATINE) 5 mg tablet TAKE 1 TABLET BY MOUTH AS NEEDED FOR VERTIGO   • nitroglycerin (NITROSTAT) 0.4 mg SL tablet DISSOLVE 1 TABLET UNDER THE TONGUE EVERY 5 MINUTES AS NEEDED FOR CHEST PAIN   • omeprazole (PriLOSEC) 40 MG capsule TAKE 1 CAPSULE(40 MG) BY MOUTH EVERY MORNING     No current facility-administered medications on file prior to visit. Allergies   Allergen Reactions   • Paxil [Paroxetine] Tongue Swelling     Pt called pcp to inform on 4/21/22 happened twice rcruz   • Penicillins Hives       Physical Exam:    /70   Ht 5' 2" (1.575 m)   Wt 95.3 kg (210 lb)   LMP 01/03/2022 (Approximate)   BMI 38.41 kg/m²     Constitutional:normal, well developed, well nourished, alert, in no distress and non-toxic and no overt pain behavior. Eyes:anicteric  HEENT:grossly intact  Neck:supple, symmetric, trachea midline and no masses   Pulmonary:even and unlabored  Cardiovascular:No edema or pitting edema present  Skin:Normal without rashes or lesions and well hydrated  Psychiatric:Mood and affect appropriate  Neurologic: Motor function is grossly intact with no focal neuro deficits. Musculoskeletal: Pain elicited with lumbar extension and lateral flexion.     Imaging  MRI lumbar spine 5/23/2022:  FINDINGS:     VERTEBRAL BODIES:  There are 5 lumbar type vertebral bodies.  Normal alignment of the lumbar spine.  No spondylolysis or spondylolisthesis. No scoliosis.  No compression fracture.        Type II Modic endplate change at K5-L6.  Otherwise, normal bone marrow signal.     SACRUM:  Normal signal within the sacrum. No evidence of insufficiency or stress fracture.     DISTAL CORD AND CONUS:  Normal size and signal within the distal cord and conus.     PARASPINAL SOFT TISSUES:  Paraspinal soft tissues are unremarkable.     LOWER THORACIC DISC SPACES:  Mild noncompressive lower thoracic degenerative change.     LUMBAR DISC SPACES:  Moderate disc height loss at L5-S1.     L1-L2:  Normal.     L2-L3:  Normal.     L3-L4:  Diffuse disc bulge.  Mild facet arthropathy.  No significant canal stenosis.  Mild right foraminal narrowing.     L4-L5:  Diffuse disc bulge.  Mild facet arthropathy.  No significant canal stenosis.  Mild bilateral foraminal narrowing.     L5-S1:  Diffuse disc bulge.  Mild facet arthropathy.  No significant canal stenosis.  Mild-to-moderate bilateral foraminal narrowing.     OTHER: Partially imaged colonic diverticulosis.     IMPRESSION:     Multilevel degenerative changes of lumbar spine with varying degrees of foraminal narrowing (mild-to-moderate bilateral L5-S1), as detailed above.  No significant canal stenosis.

## 2023-07-25 ENCOUNTER — TELEPHONE (OUTPATIENT)
Dept: PAIN MEDICINE | Facility: CLINIC | Age: 53
End: 2023-07-25

## 2023-07-25 NOTE — TELEPHONE ENCOUNTER
S/W pt and Jose on speaker phone. Advised them of the same. He stated to have the  call to schedule. He verbalized understanding.   Please assist.

## 2023-07-26 NOTE — TELEPHONE ENCOUNTER
Called patient and spoke with spouse Jose:    DENISE L3-5 MBB #1 on 8/17    Reviewed instructions: , NPO 1 hour prior, loose-fitting/comfortable clothes, if ill/fever/infx/abx to call and reschedule. Also pain level at leat 5/10 and refrain from PRN, as-needed pain meds 6h prior. Patient stated verbal understanding.

## 2023-08-14 ENCOUNTER — APPOINTMENT (OUTPATIENT)
Dept: LAB | Facility: CLINIC | Age: 53
End: 2023-08-14
Payer: COMMERCIAL

## 2023-08-14 ENCOUNTER — OFFICE VISIT (OUTPATIENT)
Dept: FAMILY MEDICINE CLINIC | Facility: CLINIC | Age: 53
End: 2023-08-14
Payer: COMMERCIAL

## 2023-08-14 VITALS
BODY MASS INDEX: 39.6 KG/M2 | TEMPERATURE: 97.8 F | DIASTOLIC BLOOD PRESSURE: 80 MMHG | HEART RATE: 81 BPM | SYSTOLIC BLOOD PRESSURE: 110 MMHG | HEIGHT: 62 IN | OXYGEN SATURATION: 90 % | WEIGHT: 215.2 LBS

## 2023-08-14 DIAGNOSIS — E87.6 HYPOKALEMIA: ICD-10-CM

## 2023-08-14 DIAGNOSIS — R73.03 PREDIABETES: ICD-10-CM

## 2023-08-14 DIAGNOSIS — E78.2 MIXED HYPERLIPIDEMIA: Chronic | ICD-10-CM

## 2023-08-14 DIAGNOSIS — E66.9 OBESITY (BMI 30-39.9): Primary | ICD-10-CM

## 2023-08-14 DIAGNOSIS — E78.2 MIXED HYPERLIPIDEMIA: Primary | Chronic | ICD-10-CM

## 2023-08-14 DIAGNOSIS — M54.16 LUMBAR RADICULOPATHY: ICD-10-CM

## 2023-08-14 DIAGNOSIS — Z12.31 ENCOUNTER FOR SCREENING MAMMOGRAM FOR MALIGNANT NEOPLASM OF BREAST: ICD-10-CM

## 2023-08-14 LAB
ALBUMIN SERPL BCP-MCNC: 3.2 G/DL (ref 3.5–5)
ALP SERPL-CCNC: 130 U/L (ref 46–116)
ALT SERPL W P-5'-P-CCNC: 24 U/L (ref 12–78)
ANION GAP SERPL CALCULATED.3IONS-SCNC: 6 MMOL/L
AST SERPL W P-5'-P-CCNC: 19 U/L (ref 5–45)
BILIRUB SERPL-MCNC: 0.21 MG/DL (ref 0.2–1)
BUN SERPL-MCNC: 12 MG/DL (ref 5–25)
CALCIUM ALBUM COR SERPL-MCNC: 9.6 MG/DL (ref 8.3–10.1)
CALCIUM SERPL-MCNC: 9 MG/DL (ref 8.3–10.1)
CHLORIDE SERPL-SCNC: 110 MMOL/L (ref 96–108)
CHOLEST SERPL-MCNC: 220 MG/DL
CO2 SERPL-SCNC: 25 MMOL/L (ref 21–32)
CREAT SERPL-MCNC: 1.08 MG/DL (ref 0.6–1.3)
GFR SERPL CREATININE-BSD FRML MDRD: 57 ML/MIN/1.73SQ M
GLUCOSE P FAST SERPL-MCNC: 107 MG/DL (ref 65–99)
HDLC SERPL-MCNC: 42 MG/DL
LDLC SERPL CALC-MCNC: 148 MG/DL (ref 0–100)
POTASSIUM SERPL-SCNC: 3.5 MMOL/L (ref 3.5–5.3)
PROT SERPL-MCNC: 7.5 G/DL (ref 6.4–8.4)
SODIUM SERPL-SCNC: 141 MMOL/L (ref 135–147)
TRIGL SERPL-MCNC: 149 MG/DL

## 2023-08-14 PROCEDURE — 36415 COLL VENOUS BLD VENIPUNCTURE: CPT

## 2023-08-14 PROCEDURE — 99214 OFFICE O/P EST MOD 30 MIN: CPT | Performed by: NURSE PRACTITIONER

## 2023-08-14 PROCEDURE — 80053 COMPREHEN METABOLIC PANEL: CPT

## 2023-08-14 PROCEDURE — 80061 LIPID PANEL: CPT

## 2023-08-14 RX ORDER — EZETIMIBE 10 MG/1
10 TABLET ORAL DAILY
Qty: 90 TABLET | Refills: 1 | Status: SHIPPED | OUTPATIENT
Start: 2023-08-14

## 2023-08-14 NOTE — ASSESSMENT & PLAN NOTE
Lipid panel was ordered to be completed prior to next office visit. Patient was advised to continue to limit fatty and fried foods in her diet.

## 2023-08-14 NOTE — PROGRESS NOTES
Name: Talia Miguel      : 1970      MRN: [id number]  Encounter Provider: ANDRZEJ Will  Encounter Date: 2023   Encounter department: [facility] [address]     1. Obesity (BMI 30-39. 9)  Assessment & Plan:  Agatha Schlossman was discontinued and patient will be trialed on Wegovy. I did inform patient that there is currently a nationwide shortage of this medication and she may not be able to begin the medication for some time. Patient still wished to have the medication ordered. I did speak with patient dietary and exercise changes which can promote weight loss. Orders:  -     Semaglutide-Weight Management (WEGOVY) 0.25 MG/0.5ML; Inject 0.5 mL (0.25 mg total) under the skin once a week    2. Mixed hyperlipidemia  Assessment & Plan:  Lipid panel was ordered to be completed prior to next office visit. Patient was advised to continue to limit fatty and fried foods in her diet. Orders:  -     Lipid Panel with Direct LDL reflex; Future    3. Prediabetes  Assessment & Plan:  Spoke with patient about the importance of limiting sugar and carbohydrates in her diet to prevent diabetes. Orders:  -     Comprehensive metabolic panel; Future    4. Hypokalemia  Assessment & Plan:  CMP was ordered to be completed prior to next office visit to assess for resolution of this. Orders:  -     Comprehensive metabolic panel; Future    5. Encounter for screening mammogram for malignant neoplasm of breast  -     Mammo screening bilateral w 3d & cad; Future; Expected date: 2023    6. Lumbar radiculopathy  Assessment & Plan:  Patient continues to follow with pain management for this. Subjective      Lumbar radiculopathy: Patient continues to follow with pain management for this and did receive an L5-S1 steroid injection in 2023. Patient is currently managed on Celebrex and Flexeril for pain.   Patient reports that her lower back pain has returned since her prior steroid injection so she is now scheduled for an upcoming nerve block with pain management. Hyperlipidemia: Patient's most recent lipid panel showed elevated total cholesterol and LDL. Patient is currently managed on Lipitor 80 mg daily. Obesity: Patient was previously prescribed Saxenda 0.6 mg daily to help with weight loss. The patient was noted to have gained 5 pounds since her last office visit. The patient is requesting to be switched from [facility] to [facility] as she feels the medication is not helping much with weight loss and she would like to do weekly injections instead of daily injections. Prediabetes: Patient's most recent hemoglobin A1c was 6.1 putting her in the prediabetes range. Patient denies polydipsia, polyphagia, or polyuria. Hypokalemia: Patient's most recent CMP did show a slightly low potassium level. Iron deficiency: Patient's most recent iron panel was normal.  Patient is not currently taking a daily iron supplement. Review of Systems   Constitutional: Positive for unexpected weight change (weight gain). Negative for chills and fever. HENT: Negative for ear pain and sore throat. Eyes: Negative for pain and visual disturbance. Respiratory: Negative for cough, chest tightness, shortness of breath and wheezing. Cardiovascular: Negative for chest pain, palpitations and leg swelling. Gastrointestinal: Negative for abdominal pain, constipation, diarrhea, nausea and vomiting. Endocrine: Negative for cold intolerance, heat intolerance, polydipsia, polyphagia and polyuria. Genitourinary: Negative for decreased urine volume, dysuria and hematuria. Musculoskeletal: Negative for arthralgias, back pain and myalgias. Skin: Negative for color change and rash. Allergic/Immunologic: Negative for environmental allergies. Neurological: Negative for dizziness, seizures, syncope, weakness, light-headedness, numbness and headaches.    Hematological: Negative for adenopathy. Psychiatric/Behavioral: Negative for confusion. The patient is not nervous/anxious. All other systems reviewed and are negative. Current Outpatient Medications on File Prior to Visit   Medication Sig   • acetaminophen (TYLENOL) 500 mg tablet Take 2 tablets (1,000 mg total) by mouth every 6 (six) hours as needed for moderate pain   • aspirin 81 mg chewable tablet Chew 1 tablet (81 mg total) daily (Patient taking differently: Chew 81 mg as needed)   • atorvastatin (LIPITOR) 80 mg tablet Take 1 tablet (80 mg total) by mouth daily   • butalbital-acetaminophen-caffeine (FIORICET,ESGIC) -40 mg per tablet TAKE 1 TABLET BY MOUTH EVERY 4 HOURS AS NEEDED FOR MIGRAINE. NO MORE THAN 3 DOSES EVERY WEEK   • celecoxib (CeleBREX) 200 mg capsule Take 1 capsule (200 mg total) by mouth 2 (two) times a day   • cholecalciferol (VITAMIN D3) 1,000 units tablet Take 1,000 Units by mouth daily   • cyclobenzaprine (FLEXERIL) 10 mg tablet Take 1 tablet (10 mg total) by mouth 3 (three) times a day as needed for muscle spasms   • Diclofenac Sodium (VOLTAREN) 1 % Apply 2 g topically 4 (four) times a day   • meclizine (ANTIVERT) 12.5 MG tablet 1 tab TID prn vertigo. • midodrine (PROAMATINE) 5 mg tablet TAKE 1 TABLET BY MOUTH AS NEEDED FOR VERTIGO   • nitroglycerin (NITROSTAT) 0.4 mg SL tablet DISSOLVE 1 TABLET UNDER THE TONGUE EVERY 5 MINUTES AS NEEDED FOR CHEST PAIN   • omeprazole (PriLOSEC) 40 MG capsule TAKE 1 CAPSULE(40 MG) BY MOUTH EVERY MORNING   • [DISCONTINUED] liraglutide (SAXENDA) injection Inject 0.1 mL (0.6 mg total) under the skin daily       Objective     /80 (BP Location: Right arm, Patient Position: Sitting, Cuff Size: Adult)   Pulse 81   Temp 97.8 °F (36.6 °C) (Tympanic)   Ht 5' 2" (1.575 m)   Wt 97.6 kg (215 lb 3.2 oz)   LMP 01/03/2022 (Approximate)   SpO2 90%   BMI 39.36 kg/m²     Physical Exam  Vitals and nursing note reviewed.    Constitutional:       General: She is not in acute distress. Appearance: Normal appearance. She is not ill-appearing. HENT:      Head: Normocephalic. Eyes:      Conjunctiva/sclera: Conjunctivae normal.   Cardiovascular:      Rate and Rhythm: Normal rate and regular rhythm. Pulses: Normal pulses. Carotid pulses are 2+ on the right side and 2+ on the left side. Radial pulses are 2+ on the right side and 2+ on the left side. Posterior tibial pulses are 2+ on the right side and 2+ on the left side. Heart sounds: Normal heart sounds. No murmur heard. Pulmonary:      Effort: Pulmonary effort is normal. No respiratory distress. Breath sounds: Normal breath sounds. No decreased breath sounds, wheezing, rhonchi or rales. Abdominal:      General: Abdomen is flat. Bowel sounds are normal. There is no distension. Palpations: Abdomen is soft. Tenderness: There is no abdominal tenderness. There is no guarding. Musculoskeletal:         General: Normal range of motion. Cervical back: Normal range of motion. Right lower leg: No edema. Left lower leg: No edema. Skin:     General: Skin is warm and dry. Capillary Refill: Capillary refill takes less than 2 seconds. Neurological:      General: No focal deficit present. Mental Status: She is alert and oriented to person, place, and time. Psychiatric:         Mood and Affect: Mood normal.         Behavior: Behavior normal.         Thought Content:  Thought content normal.         Judgment: Judgment normal.       ANDRZEJ Mcwilliams

## 2023-08-14 NOTE — ASSESSMENT & PLAN NOTE
Devonda Quirk was discontinued and patient will be trialed on Wegovy. I did inform patient that there is currently a nationwide shortage of this medication and she may not be able to begin the medication for some time. Patient still wished to have the medication ordered. I did speak with patient dietary and exercise changes which can promote weight loss.

## 2023-08-17 ENCOUNTER — TELEPHONE (OUTPATIENT)
Dept: OBGYN CLINIC | Facility: CLINIC | Age: 53
End: 2023-08-17

## 2023-08-17 ENCOUNTER — HOSPITAL ENCOUNTER (OUTPATIENT)
Dept: RADIOLOGY | Facility: MEDICAL CENTER | Age: 53
Discharge: HOME | End: 2023-08-17
Payer: COMMERCIAL

## 2023-08-17 ENCOUNTER — TELEPHONE (OUTPATIENT)
Dept: FAMILY MEDICINE CLINIC | Facility: CLINIC | Age: 53
End: 2023-08-17

## 2023-08-17 VITALS
HEART RATE: 68 BPM | TEMPERATURE: 98 F | SYSTOLIC BLOOD PRESSURE: 100 MMHG | OXYGEN SATURATION: 98 % | RESPIRATION RATE: 18 BRPM | DIASTOLIC BLOOD PRESSURE: 68 MMHG

## 2023-08-17 DIAGNOSIS — M47.816 LUMBAR SPONDYLOSIS: ICD-10-CM

## 2023-08-17 PROCEDURE — 64494 INJ PARAVERT F JNT L/S 2 LEV: CPT | Performed by: ANESTHESIOLOGY

## 2023-08-17 PROCEDURE — 64493 INJ PARAVERT F JNT L/S 1 LEV: CPT | Performed by: ANESTHESIOLOGY

## 2023-08-17 RX ADMIN — Medication 1.5 ML: at 08:24

## 2023-08-17 NOTE — TELEPHONE ENCOUNTER
Diann Castro (NFM: [id number])  Rx #: [id number] DOEXBW 0.25MG/0.5ML auto-injectors       Form [facility] Electronic PA Form (4887 NCPDP)      Your prior authorization for Ulises Sotelo has been approved! MORE INFO  Personalized support and financial assistance may be available through the reMail program. For more information, and to see program requirements, click on the More Info button to the right. Message from plan: Your PA request has been approved.  Additional information will be provided in the approval communication

## 2023-08-17 NOTE — DISCHARGE INSTRUCTIONS

## 2023-08-17 NOTE — TELEPHONE ENCOUNTER
Good morning, we had a pt this AM for Dr. Trey Burns she was supposed to have had a bilat L3-5 MBB#1 but was unable to tolerate and only had the left side done. We changed the consents and in the computer appt line what was done just wanted to give you a heads up!

## 2023-08-17 NOTE — H&P
History of Present Illness: The patient is a 52 y.o. female who presents with complaints of back pain    Past Medical History:   Diagnosis Date   • Acute CVA (cerebrovascular accident) ([address]) 2/5/2020   • Acute respiratory insufficiency 11/1/2022   • Anemia     Hx Iron Infusions last one 6-2021   • Angina pectoris ([address])    • COVID-19 11/1/2022   • GERD (gastroesophageal reflux disease)    • Heart murmur    • Hyperlipidemia    • Migraine    • TIA (transient ischemic attack) 2010    no residual   • Tobacco use 5/5/2020   • Vertigo        Past Surgical History:   Procedure Laterality Date   • FOOT SURGERY Right     ankle   • FOREARM SURGERY Left    • SD INCISION EXTENSOR TENDON SHEATH WRIST Right 2/1/2022    Procedure: RELEASE DEQUERVAINS, TENOSYNOVECTOMY OF THE APL AND EPB TENDONS;  Surgeon: Colon Schwab, MD;  Location: [facility] MAIN OR;  Service: Orthopedics   • SUPERFICIAL LYMPH NODE BIOPSY / EXCISION Left    • TUBAL LIGATION  1993         Current Outpatient Medications:   •  acetaminophen (TYLENOL) 500 mg tablet, Take 2 tablets (1,000 mg total) by mouth every 6 (six) hours as needed for moderate pain, Disp: 30 tablet, Rfl: 0  •  aspirin 81 mg chewable tablet, Chew 1 tablet (81 mg total) daily (Patient taking differently: Chew 81 mg as needed), Disp: 90 tablet, Rfl: 3  •  atorvastatin (LIPITOR) 80 mg tablet, Take 1 tablet (80 mg total) by mouth daily, Disp: 90 tablet, Rfl: 0  •  butalbital-acetaminophen-caffeine (FIORICET,ESGIC) -40 mg per tablet, TAKE 1 TABLET BY MOUTH EVERY 4 HOURS AS NEEDED FOR MIGRAINE.  NO MORE THAN 3 DOSES EVERY WEEK, Disp: 10 tablet, Rfl: 0  •  celecoxib (CeleBREX) 200 mg capsule, Take 1 capsule (200 mg total) by mouth 2 (two) times a day, Disp: 60 capsule, Rfl: 1  •  cholecalciferol (VITAMIN D3) 1,000 units tablet, Take 1,000 Units by mouth daily, Disp: , Rfl:   •  cyclobenzaprine (FLEXERIL) 10 mg tablet, Take 1 tablet (10 mg total) by mouth 3 (three) times a day as needed for muscle spasms, Disp: 30 tablet, Rfl: 2  •  Diclofenac Sodium (VOLTAREN) 1 %, Apply 2 g topically 4 (four) times a day, Disp: 100 g, Rfl: 3  •  ezetimibe (ZETIA) 10 mg tablet, Take 1 tablet (10 mg total) by mouth daily, Disp: 90 tablet, Rfl: 1  •  meclizine (ANTIVERT) 12.5 MG tablet, 1 tab TID prn vertigo. , Disp: 30 tablet, Rfl: 0  •  midodrine (PROAMATINE) 5 mg tablet, TAKE 1 TABLET BY MOUTH AS NEEDED FOR VERTIGO, Disp: 90 tablet, Rfl: 1  •  nitroglycerin (NITROSTAT) 0.4 mg SL tablet, DISSOLVE 1 TABLET UNDER THE TONGUE EVERY 5 MINUTES AS NEEDED FOR CHEST PAIN, Disp: 25 tablet, Rfl: 0  •  omeprazole (PriLOSEC) 40 MG capsule, TAKE 1 CAPSULE(40 MG) BY MOUTH EVERY MORNING, Disp: 90 capsule, Rfl: 0  •  Semaglutide-Weight Management (WEGOVY) 0.25 MG/0.5ML, Inject 0.5 mL (0.25 mg total) under the skin once a week, Disp: 0.5 mL, Rfl: 4    Current Facility-Administered Medications:   •  lidocaine (PF) (XYLOCAINE-MPF) 2 % injection 3 mL, 3 mL, Perineural, Once, Will Patric Pace MD    Allergies   Allergen Reactions   • Paxil [Paroxetine] Tongue Swelling     Pt called pcp to inform on 4/21/22 happened twice rcruz   • Penicillins Hives       Physical Exam:   Vitals:    08/17/23 0802   BP: 102/68   Pulse: 59   Resp: 18   Temp: 98 °F (36.7 °C)   SpO2: 97%     General: Awake, Alert, Oriented x 3, Mood and affect appropriate  Respiratory: Respirations even and unlabored  Cardiovascular: Peripheral pulses intact; no edema  Musculoskeletal Exam: pain with lumbar ROM    ASA Score: 2    Patient/Chart Verification  Patient ID Verified: Verbal  ID Band Applied: No  Consents Confirmed: Procedural, To be obtained in the Pre-Procedure area  H&P( within 30 days) Verified: To be obtained in the Pre-Procedure area  Interval H&P(within 24 hr) Complete (required for Outpatients and Surgery Admit only): To be obtained in the Pre-Procedure area  Allergies Reviewed:  Yes  Anticoag/NSAID held?: NA  Currently on antibiotics?: No  Pregnancy denied?: NA    Assessment:   1.  Lumbar spondylosis        Plan: DENISE L3-5 MBB #1

## 2023-08-31 ENCOUNTER — TELEPHONE (OUTPATIENT)
Age: 53
End: 2023-08-31

## 2023-08-31 NOTE — TELEPHONE ENCOUNTER
.Caller: pt    Doctor: Natalee Wilks    Reason for call: please be on look out for pts pain diary. She will place it in her Mychart after she gets out of work tonight.      Call back#: [phone]

## 2023-08-31 NOTE — TELEPHONE ENCOUNTER
Caller: patient    Doctor: truman    Reason for call: right back pain,  Would like to schedule a procedure  Call back#:

## 2023-08-31 NOTE — TELEPHONE ENCOUNTER
S/w pt. Pt is requesting to schedule her RT sided lumbar MBB. Pt was only able to tolerate the LT side on 8/17. Discussed with Dr Dwayne Encarnacion who advised pt will need to proceed with the LT side until complete and go from there. Attempted to reach pt to discuss and ask if pt returned her pain diary after LT lumbar MBB#1. Detailed VMMLOM as per BRANDI advising pt of same. CB# provided.

## 2023-09-01 ENCOUNTER — PATIENT MESSAGE (OUTPATIENT)
Dept: PAIN MEDICINE | Facility: CLINIC | Age: 53
End: 2023-09-01

## 2023-09-01 NOTE — TELEPHONE ENCOUNTER
S/W pt and walked pt through uploading pain diary to PPT[facility], pain diary received while on phone with pt and sent to [facility]. Nurse also informed pt of previous notation of proceeding with left sided MBB and addressing right sided pain after left side. Nurse also advised pt once MBB reviewed and okay'd to move fwd with MBB#2 our office will call to schedule. Pt verbalized understanding and appreciative of call.

## 2023-09-01 NOTE — TELEPHONE ENCOUNTER
Caller: Cristiana  Doctor: Leighton Ruiz    Reason for call: patient needs help uploading pain diary     Call back#: [phone]

## 2023-09-06 NOTE — PATIENT COMMUNICATION
Called patient to scheduled. Explained procedure again to make sure she is fully aware of the process being exactly the same as the 1st MBB. She wants to talk to  about it 1st and will call back on Friday 9/8 to schedule.

## 2023-09-28 ENCOUNTER — TELEPHONE (OUTPATIENT)
Dept: FAMILY MEDICINE CLINIC | Facility: CLINIC | Age: 53
End: 2023-09-28

## 2023-09-28 DIAGNOSIS — K21.9 GASTROESOPHAGEAL REFLUX DISEASE, UNSPECIFIED WHETHER ESOPHAGITIS PRESENT: ICD-10-CM

## 2023-09-28 RX ORDER — OMEPRAZOLE 40 MG/1
CAPSULE, DELAYED RELEASE ORAL
Qty: 90 CAPSULE | Refills: 0 | Status: SHIPPED | OUTPATIENT
Start: 2023-09-28

## 2023-09-28 NOTE — TELEPHONE ENCOUNTER
Patient called stating she went to [facility] on [address] to get a flu shot a week ago and she states that they really did not give it to her she said they put the needle by her arm and pricked the skin but she knows they did not give it to her arm did not get red or sore. She wants another one. I told her we would give her a call back.

## 2023-09-28 NOTE — TELEPHONE ENCOUNTER
I feel it is very unlikely that the patient was incorrectly given the influenza vaccine especially if she felt the needle ramon her skin. Many people do have an injection site reaction such as redness or soreness after receiving the vaccine but this is not always the case. If she was already given the flu shot I do not feel it is appropriate for her to receive this again this flu season.

## 2023-09-29 NOTE — TELEPHONE ENCOUNTER
Sita Alejo from [facility] called in to notify us about this issue, Per Sita Alejo she spoke to her manager(the pharmacist) who stated it was our discretion if we wanted to revaccinate the patient.

## 2023-12-20 DIAGNOSIS — K21.9 GASTROESOPHAGEAL REFLUX DISEASE, UNSPECIFIED WHETHER ESOPHAGITIS PRESENT: ICD-10-CM

## 2023-12-20 RX ORDER — OMEPRAZOLE 40 MG/1
CAPSULE, DELAYED RELEASE ORAL
Qty: 90 CAPSULE | Refills: 1 | Status: SHIPPED | OUTPATIENT
Start: 2023-12-20

## 2023-12-29 DIAGNOSIS — R42 VERTIGO: Chronic | ICD-10-CM

## 2023-12-29 RX ORDER — MECLIZINE HCL 12.5 MG/1
TABLET ORAL
Qty: 30 TABLET | Refills: 0 | Status: SHIPPED | OUTPATIENT
Start: 2023-12-29

## 2024-01-05 ENCOUNTER — TELEPHONE (OUTPATIENT)
Dept: FAMILY MEDICINE CLINIC | Facility: CLINIC | Age: 54
End: 2024-01-05

## 2024-01-05 NOTE — TELEPHONE ENCOUNTER
On 10/30/2023 patient established care with Rick Soliz MD   [address] [place] [address]   Phone: [phone]   Fax: [phone]

## 2024-01-08 NOTE — TELEPHONE ENCOUNTER
01/08/24 1:40 PM        The office's request has been received, reviewed, and the patient chart updated. The PCP has successfully been removed with a patient attribution note. This message will now be completed.        Thank you  Leona Colindres

## 2024-06-06 DIAGNOSIS — M51.36 BULGING OF LUMBAR INTERVERTEBRAL DISC: ICD-10-CM

## 2024-06-06 DIAGNOSIS — K21.9 GASTROESOPHAGEAL REFLUX DISEASE, UNSPECIFIED WHETHER ESOPHAGITIS PRESENT: ICD-10-CM

## 2024-06-06 DIAGNOSIS — R42 VERTIGO: Chronic | ICD-10-CM

## 2024-06-06 DIAGNOSIS — M43.06 LUMBAR SPONDYLOLYSIS: ICD-10-CM

## 2024-06-07 RX ORDER — MECLIZINE HCL 12.5 MG/1
TABLET ORAL
Qty: 30 TABLET | Refills: 0 | Status: SHIPPED | OUTPATIENT
Start: 2024-06-07

## 2024-06-07 RX ORDER — CELECOXIB 200 MG/1
CAPSULE ORAL
Qty: 60 CAPSULE | Refills: 0 | Status: SHIPPED | OUTPATIENT
Start: 2024-06-07

## 2024-06-07 RX ORDER — OMEPRAZOLE 40 MG/1
CAPSULE, DELAYED RELEASE ORAL
Qty: 90 CAPSULE | Refills: 1 | Status: SHIPPED | OUTPATIENT
Start: 2024-06-07

## 2025-05-05 NOTE — TELEPHONE ENCOUNTER
Chronic, at goal (stable), continue current treatment plan          Caller: Patient    Doctor: Syeda Cummings    Reason for call: The patient is requesting Dr Syeda Cummings to call her  to describe/explain the suggested procedure so they can both decide if to move forward with it or not.     Call back#: Adam Guzman [phone]